# Patient Record
Sex: FEMALE | Race: WHITE | NOT HISPANIC OR LATINO | Employment: FULL TIME | URBAN - METROPOLITAN AREA
[De-identification: names, ages, dates, MRNs, and addresses within clinical notes are randomized per-mention and may not be internally consistent; named-entity substitution may affect disease eponyms.]

---

## 2017-01-03 ENCOUNTER — OFFICE VISIT (OUTPATIENT)
Dept: FAMILY MEDICINE CLINIC | Facility: CLINIC | Age: 57
End: 2017-01-03

## 2017-01-03 VITALS
SYSTOLIC BLOOD PRESSURE: 124 MMHG | TEMPERATURE: 98.7 F | HEIGHT: 63 IN | DIASTOLIC BLOOD PRESSURE: 82 MMHG | OXYGEN SATURATION: 99 % | RESPIRATION RATE: 16 BRPM | BODY MASS INDEX: 9.92 KG/M2 | WEIGHT: 56 LBS | HEART RATE: 83 BPM

## 2017-01-03 DIAGNOSIS — J32.0 CHRONIC MAXILLARY SINUSITIS: Primary | ICD-10-CM

## 2017-01-03 PROBLEM — J30.1 SEASONAL ALLERGIC RHINITIS DUE TO POLLEN: Status: ACTIVE | Noted: 2017-01-03

## 2017-01-03 PROCEDURE — 99213 OFFICE O/P EST LOW 20 MIN: CPT | Performed by: INTERNAL MEDICINE

## 2017-01-03 RX ORDER — ASCORBATE CALCIUM 500 MG
TABLET ORAL
COMMUNITY

## 2017-01-03 RX ORDER — MOMETASONE FUROATE 50 UG/1
2 SPRAY, METERED NASAL DAILY
Qty: 17 G | Refills: 3 | Status: SHIPPED | OUTPATIENT
Start: 2017-01-03

## 2017-01-03 RX ORDER — MULTIVIT WITH MINERALS/LUTEIN
250 TABLET ORAL DAILY
COMMUNITY
End: 2017-11-21 | Stop reason: SINTOL

## 2017-01-03 RX ORDER — AMOXICILLIN 875 MG/1
875 TABLET, COATED ORAL 2 TIMES DAILY
Qty: 20 TABLET | Refills: 0 | Status: SHIPPED | OUTPATIENT
Start: 2017-01-03 | End: 2017-08-30

## 2017-01-03 NOTE — PROGRESS NOTES
Subjective   Arcelia Roberson is a 56 y.o. female.     History of Present Illness   Patient said the congestion for about one week.  She has a history of chronic allergies and sinusitis.  She was placed on Nasonex and Amoxil and asked to follow-up or days not better.  The following portions of the patient's history were reviewed and updated as appropriate: allergies, current medications, past family history, past medical history, past social history, past surgical history and problem list.    Review of Systems   Constitutional: Negative for fatigue and fever.   HENT: Positive for congestion and sinus pressure. Negative for trouble swallowing.    Eyes: Negative for discharge and visual disturbance.   Respiratory: Negative for choking and shortness of breath.    Cardiovascular: Negative for chest pain and palpitations.   Gastrointestinal: Negative for abdominal pain and blood in stool.   Endocrine: Negative.    Genitourinary: Negative for genital sores and hematuria.   Musculoskeletal: Negative for gait problem and joint swelling.   Skin: Negative for color change, pallor, rash and wound.   Allergic/Immunologic: Positive for environmental allergies. Negative for immunocompromised state.   Neurological: Negative for facial asymmetry and speech difficulty.   Psychiatric/Behavioral: Negative for hallucinations and suicidal ideas.       Objective   Physical Exam   Constitutional: She is oriented to person, place, and time. She appears well-developed and well-nourished.   HENT:   Head: Normocephalic.   Maxillary tenderness   Eyes: Conjunctivae are normal. Pupils are equal, round, and reactive to light.   Neck: Normal range of motion. Neck supple.   Cardiovascular: Normal rate, regular rhythm and normal heart sounds.    Pulmonary/Chest: Effort normal and breath sounds normal.   Abdominal: Soft. Bowel sounds are normal.   Musculoskeletal: Normal range of motion.   Neurological: She is alert and oriented to person, place, and  time.   Skin: Skin is warm and dry.   Psychiatric: She has a normal mood and affect. Her behavior is normal. Judgment and thought content normal.   Nursing note and vitals reviewed.      Assessment/Plan   Problems Addressed this Visit     None      Visit Diagnoses     Chronic maxillary sinusitis    -  Primary

## 2017-01-03 NOTE — MR AVS SNAPSHOT
Arcelia Roberson   1/3/2017 10:45 AM   Office Visit    Dept Phone:  [phone]   Encounter #:  [id number]    Provider:  Maikol Dawson MD   Department:  [facility]                Your Full Care Plan              Today's Medication Changes          These changes are accurate as of: 1/3/17 12:09 PM.  If you have any questions, ask your nurse or doctor.               New Medication(s)Ordered:     amoxicillin 875 MG tablet   Commonly known as:  AMOXIL   Take 1 tablet by mouth 2 (Two) Times a Day.   Started by:  Maikol Dawson MD       mometasone 50 MCG/ACT nasal spray   Commonly known as:  NASONEX   2 sprays into each nostril Daily.   Started by:  Maikol Dawson MD            Where to Get Your Medications      These medications were sent to [facility]/pharmacy #[id number] [place], [place] - [address] [phone] [facility] [phone]   [address] [address]     Phone:  [phone]     amoxicillin 875 MG tablet    mometasone 50 MCG/ACT nasal spray                  Your Updated Medication List          This list is accurate as of: 1/3/17 12:09 PM.  Always use your most recent med list.                amoxicillin 875 MG tablet   Commonly known as:  AMOXIL   Take 1 tablet by mouth 2 (Two) Times a Day.       mometasone 50 MCG/ACT nasal spray   Commonly known as:  NASONEX   2 sprays into each nostril Daily.       vitamin C 250 MG tablet   Commonly known as:  ASCORBIC ACID       Vitamin E 100 UNITS tablet               You Were Diagnosed With        Codes Comments    Chronic maxillary sinusitis    -  Primary ICD-10-CM: J32.0  ICD-9-CM: 473.0       Instructions     None    Patient Instructions History      Upcoming Appointments     Visit Type Date Time Department    OFFICE VISIT 1/3/2017 10:45 AM CINDY Sands Signup     [facility] allows you to send messages to your doctor, view your test results,  "renew your prescriptions, schedule appointments, and more. To sign up, go to [email or web address] and click on the Sign Up Now link in the New User? box. Enter your [employer] Activation Code exactly as it appears below along with the last four digits of your Social Security Number and your Date of Birth () to complete the sign-up process. If you do not sign up before the expiration date, you must request a new code.    [employer] Activation Code: [email or web address]  Expires: 2017 12:09 PM    If you have questions, you can email [email or web address] or call [phone] to talk to our [employer] staff. Remember, [employer] is NOT to be used for urgent needs. For medical emergencies, dial 911.               Other Info from Your Visit           Allergies     Azithromycin      Codeine        Reason for Visit     Cough congestion x 1 week      Vital Signs     Blood Pressure Pulse Temperature Respirations Height Weight    124/82 (BP Location: Right arm, Patient Position: Sitting, Cuff Size: Adult) 83 98.7 °F (37.1 °C) (Oral) 16 63\" (160 cm) 56 lb (25.4 kg)    Oxygen Saturation Body Mass Index Smoking Status             99% 9.92 kg/m2 Never Smoker         Problems and Diagnoses Noted     Seasonal allergic rhinitis due to pollen    Chronic maxillary sinusitis    -  Primary        "

## 2017-04-12 ENCOUNTER — APPOINTMENT (OUTPATIENT)
Dept: WOMENS IMAGING | Facility: HOSPITAL | Age: 57
End: 2017-04-12

## 2017-04-12 PROCEDURE — G0202 SCR MAMMO BI INCL CAD: HCPCS | Performed by: RADIOLOGY

## 2017-04-12 PROCEDURE — 77063 BREAST TOMOSYNTHESIS BI: CPT | Performed by: RADIOLOGY

## 2017-04-12 PROCEDURE — 77067 SCR MAMMO BI INCL CAD: CPT | Performed by: RADIOLOGY

## 2017-08-29 PROCEDURE — 93000 ELECTROCARDIOGRAM COMPLETE: CPT | Performed by: FAMILY MEDICINE

## 2017-08-29 NOTE — PROGRESS NOTES
SUBJECTIVE:   Arcelia Roberson is a 57 y.o., female, who presents for a complete physical examination.    Occupation:  Reviewed  Family History:  Reviewed  Past Medical History: Reviewed  Surgical History:  Reviewed  Social History:  Reviewed    Review of Systems:  Constitutional:  Negative for chills, fever, dizziness, weight loss, diaphoresis,   Eyes:  Negative for blurred vision, redness, discharge, diplopia, photophobia or itching.  ENT:  Negative for earache, ST, toothache, rhinorrhea, hoarseness, or PND.  Musculoskeletal:  Negative for neck pain.  No heat, myalgia, redness or joint pain.  She occasionally gets what she feels is sciatica on the right side which is usually controlled with an Advil.  Cardiovascular:  Negative for orthopnea, chest pain, LE edema, palpitations, or rapid heart rate.    Skin:  Negative for bruising, swelling, rash, abrasions, or itching.    Respiratory:  Negatie for pleuritic chest pain, shortness of air, nonproductive/productive cough, hemoptysis, or HUGHES.    Neurologic:  Negative for history of weakness, numbness, paresthesia, loss of consciousness, speech change, or ataxia.    Gastrointestinal:  Negative for melena, hematochezia, nausea, vomiting, change in bowel habits.    Genitourinary:  Negative for flank pain, dysuria, frequency, or hematuria.    Psychiatric:  Negative for agitation, suicidal ideation, change in mental status, depression, confusion, or insomnia.  Hematologic:  Negative for nodes, bruising, bleeding, or petechiae.  Immunologic:  Negative for atopic dermatitis, sneezing, rhinorrhea, or hives.    ALLERGIES:  Z-Esequiel and codeine    OBJECTIVE:    Vital Signs:  Reviewed and stable.  Blood pressure 148/78  Constitutional:  Alert and oriented x3, in no acute distress.  Eyes:  Sclerae white, conjunctivae clear.  Lids are without lag.  PERRLA.    Ears:  No scars, lesions or masses.  Tympanic membranes translucent, nonbulging, and mobile. Canal walls are pink.  Septum is  midline.  Raised resolving lesion is present at the upper lip border needing the nasal septum.  It has appearance of a cold sore.   Mouth:  Lips are pink and symmetrical.  Gums are pink.  Good dentition.    Throat:  Oral mucosa pink and moist.  Salivery glands intact.  Soft and hard palates contiguous.  Tongue moist without ulcers.  Gag reflex present.    Neck:  Full range of motion.  Trachea midline position.  No thyromegaly.   Respiratory:  Respirations are even and unlabored.  Lung fields with no flatness, dullness, or hyperresonance.  Clear and equal breath sounds with no adventitious sounds bilaterally.    Cardiovascular:  No lifts, heaves, or thrills.  PMI present.  S1 and S2 not exaggerated or diminished.  Regular rate and rhythm, without murmurs, rubs or gallops.  Normal carotids.  Pedal pulses are within normal limits bilaterally.  No edema.  No varicositis.    Chest:  Equal bilateral expansion  Abdomen:  No masses or tenderness.  Bowel sounds active x4 quadrants.  Live and spleen are without tenderness or enlargement.  No hernias.    Digital Rectal Exam:  Performed in May by her gynecologist and was normal.  Colonoscopy is up-to-date  Genitalia:  Patient states GYN exam and breast exam up-to-date per her gynecologist  Lymphatic:  Areas palpated are not enlarged.    Musculoskeletal:  Gait coordinated and smooth.  Digits are without clubbing or cyanosis.   Skin:  No rashes, lesions, or ulcers.  No discoloration.  Warm and dry.  Normal turgor.    Neurologic:  Cranial nerves are intact.  Deep tendon reflexes are 2+ bilaterally.  Superficial touch and pain sensation intact bilaterally.  Psychiatric:  Judgement and insight are normal.  Orientation to time, place and person.  Normal mood and affect.  Memory intact.     EKG is done here and interpreted by me.  Indication complete physical.  EKG compared to May 2014 is normal and unchanged.     Chest x-ray is done here and interpreted by me.  No old x-rays  available for comparison.  Indication is complete physical.  X-ray shows no abnormalities and appears normal.      ECG 12 Lead  Date/Time: 8/29/2017 8:33 AM  Performed by: NESTOR GRANT  Authorized by: NESTOR GRANT   Clinical impression: normal ECG            LABORATORY:  CMP fasting lipid TSH thyroid profile CBC urinalysis vitamin D Hemoccult are ordered.    ASSESSMENT:  Complete physical examination.      PL.AN:  Patient will follow up on labs.  Diet and exercise discussed.  Valtrex 2 g twice a day.

## 2017-08-30 ENCOUNTER — OFFICE VISIT (OUTPATIENT)
Dept: FAMILY MEDICINE CLINIC | Facility: CLINIC | Age: 57
End: 2017-08-30

## 2017-08-30 VITALS
BODY MASS INDEX: 26.93 KG/M2 | HEART RATE: 66 BPM | WEIGHT: 152 LBS | HEIGHT: 63 IN | RESPIRATION RATE: 18 BRPM | SYSTOLIC BLOOD PRESSURE: 148 MMHG | TEMPERATURE: 98.4 F | DIASTOLIC BLOOD PRESSURE: 78 MMHG | OXYGEN SATURATION: 100 %

## 2017-08-30 DIAGNOSIS — Z00.00 ENCOUNTER FOR PREVENTATIVE ADULT HEALTH CARE EXAMINATION: Primary | ICD-10-CM

## 2017-08-30 LAB
25(OH)D3 SERPL-MCNC: 36.8 NG/ML (ref 30–100)
ALBUMIN SERPL-MCNC: 4.4 G/DL (ref 3.5–5.2)
ALBUMIN/GLOB SERPL: 1.7 G/DL
ALP SERPL-CCNC: 61 U/L (ref 39–117)
ALT SERPL W P-5'-P-CCNC: 23 U/L (ref 1–33)
ANION GAP SERPL CALCULATED.3IONS-SCNC: 9.9 MMOL/L
AST SERPL-CCNC: 24 U/L (ref 1–32)
BACTERIA UR QL AUTO: NORMAL /HPF
BILIRUB SERPL-MCNC: 0.4 MG/DL (ref 0.1–1.2)
BILIRUB UR QL STRIP: NEGATIVE
BUN BLD-MCNC: 14 MG/DL (ref 6–20)
BUN/CREAT SERPL: 17.7 (ref 7–25)
CALCIUM SPEC-SCNC: 9.5 MG/DL (ref 8.6–10.5)
CHLORIDE SERPL-SCNC: 104 MMOL/L (ref 98–107)
CHOLEST SERPL-MCNC: 220 MG/DL (ref 0–200)
CLARITY UR: CLEAR
CO2 SERPL-SCNC: 28.1 MMOL/L (ref 22–29)
COLOR UR: YELLOW
CREAT BLD-MCNC: 0.79 MG/DL (ref 0.57–1)
ERYTHROCYTE [DISTWIDTH] IN BLOOD BY AUTOMATED COUNT: 12.7 % (ref 4.5–15)
GFR SERPL CREATININE-BSD FRML MDRD: 75 ML/MIN/1.73
GLOBULIN UR ELPH-MCNC: 2.6 GM/DL
GLUCOSE BLD-MCNC: 92 MG/DL (ref 65–99)
GLUCOSE UR STRIP-MCNC: NEGATIVE MG/DL
HCT VFR BLD AUTO: 35.5 % (ref 31–42)
HDLC SERPL-MCNC: 64 MG/DL (ref 40–60)
HGB BLD-MCNC: 11.7 G/DL (ref 12–18)
HGB UR QL STRIP.AUTO: NEGATIVE
KETONES UR QL STRIP: NEGATIVE
LDLC SERPL CALC-MCNC: 143 MG/DL (ref 0–100)
LDLC/HDLC SERPL: 2.23 {RATIO}
LEUKOCYTE ESTERASE UR QL STRIP.AUTO: NEGATIVE
LYMPHOCYTES # BLD AUTO: 1.8 10*3/MM3 (ref 1.2–3.4)
LYMPHOCYTES NFR BLD AUTO: 28 % (ref 21–51)
MCH RBC QN AUTO: 30.6 PG (ref 26.1–33.1)
MCHC RBC AUTO-ENTMCNC: 33 G/DL (ref 33–37)
MCV RBC AUTO: 92.8 FL (ref 80–99)
MONOCYTES # BLD AUTO: 0.3 10*3/MM3 (ref 0.1–0.6)
MONOCYTES NFR BLD AUTO: 5.2 % (ref 2–9)
NEUTROPHILS # BLD AUTO: 4.2 10*3/MM3 (ref 1.4–6.5)
NEUTROPHILS NFR BLD AUTO: 66.8 % (ref 42–75)
NITRITE UR QL STRIP: NEGATIVE
PH UR STRIP.AUTO: 6.5 [PH] (ref 4.6–8)
PLATELET # BLD AUTO: 233 10*3/MM3 (ref 150–450)
PMV BLD AUTO: 8 FL (ref 7.1–10.5)
POTASSIUM BLD-SCNC: 4.2 MMOL/L (ref 3.5–5.2)
PROT SERPL-MCNC: 7 G/DL (ref 6–8.5)
PROT UR QL STRIP: NEGATIVE
RBC # BLD AUTO: 3.83 10*6/MM3 (ref 4–6)
RBC # UR: NORMAL /HPF
REF LAB TEST METHOD: NORMAL
SODIUM BLD-SCNC: 142 MMOL/L (ref 136–145)
SP GR UR STRIP: <=1.005 (ref 1–1.03)
SQUAMOUS #/AREA URNS HPF: NORMAL /HPF
T-UPTAKE NFR SERPL: 1.08 TBI (ref 0.8–1.3)
T4 SERPL-MCNC: 6.45 MCG/DL (ref 4.5–11.7)
TRIGL SERPL-MCNC: 65 MG/DL (ref 0–150)
TSH SERPL DL<=0.05 MIU/L-ACNC: 1.62 MIU/ML (ref 0.27–4.2)
UROBILINOGEN UR QL STRIP: NORMAL
VLDLC SERPL-MCNC: 13 MG/DL (ref 5–40)
WBC NRBC COR # BLD: 6.3 10*3/MM3 (ref 4.5–10)
WBC UR QL AUTO: NORMAL /HPF

## 2017-08-30 PROCEDURE — 71020 XR CHEST PA AND LATERAL: CPT | Performed by: FAMILY MEDICINE

## 2017-08-30 PROCEDURE — 99396 PREV VISIT EST AGE 40-64: CPT | Performed by: FAMILY MEDICINE

## 2017-08-30 PROCEDURE — 82306 VITAMIN D 25 HYDROXY: CPT | Performed by: FAMILY MEDICINE

## 2017-08-30 PROCEDURE — 84479 ASSAY OF THYROID (T3 OR T4): CPT | Performed by: FAMILY MEDICINE

## 2017-08-30 PROCEDURE — 36415 COLL VENOUS BLD VENIPUNCTURE: CPT | Performed by: FAMILY MEDICINE

## 2017-08-30 PROCEDURE — 80053 COMPREHEN METABOLIC PANEL: CPT | Performed by: FAMILY MEDICINE

## 2017-08-30 PROCEDURE — 80061 LIPID PANEL: CPT | Performed by: FAMILY MEDICINE

## 2017-08-30 PROCEDURE — 84443 ASSAY THYROID STIM HORMONE: CPT | Performed by: FAMILY MEDICINE

## 2017-08-30 PROCEDURE — 81001 URINALYSIS AUTO W/SCOPE: CPT | Performed by: FAMILY MEDICINE

## 2017-08-30 PROCEDURE — 85025 COMPLETE CBC W/AUTO DIFF WBC: CPT | Performed by: FAMILY MEDICINE

## 2017-08-30 PROCEDURE — 84436 ASSAY OF TOTAL THYROXINE: CPT | Performed by: FAMILY MEDICINE

## 2017-08-30 RX ORDER — NIACIN 500 MG
500 TABLET ORAL NIGHTLY
COMMUNITY

## 2017-08-30 RX ORDER — ESTRADIOL 0.07 MG/D
1 FILM, EXTENDED RELEASE TRANSDERMAL 2 TIMES WEEKLY
COMMUNITY

## 2017-08-30 RX ORDER — VALACYCLOVIR HYDROCHLORIDE 1 G/1
TABLET, FILM COATED ORAL
Qty: 21 TABLET | Refills: 0 | Status: SHIPPED | OUTPATIENT
Start: 2017-08-30 | End: 2017-12-15 | Stop reason: SDUPTHER

## 2017-08-30 RX ORDER — VALACYCLOVIR HYDROCHLORIDE 1 G/1
TABLET, FILM COATED ORAL
Qty: 21 TABLET | Refills: 0 | Status: SHIPPED | OUTPATIENT
Start: 2017-08-30 | End: 2017-08-30 | Stop reason: SDUPTHER

## 2017-11-21 ENCOUNTER — OFFICE VISIT (OUTPATIENT)
Dept: FAMILY MEDICINE CLINIC | Facility: CLINIC | Age: 57
End: 2017-11-21

## 2017-11-21 VITALS
RESPIRATION RATE: 18 BRPM | OXYGEN SATURATION: 100 % | TEMPERATURE: 97.8 F | WEIGHT: 155 LBS | SYSTOLIC BLOOD PRESSURE: 140 MMHG | HEIGHT: 63 IN | DIASTOLIC BLOOD PRESSURE: 70 MMHG | BODY MASS INDEX: 27.46 KG/M2 | HEART RATE: 66 BPM

## 2017-11-21 DIAGNOSIS — M54.42 LEFT-SIDED LOW BACK PAIN WITH LEFT-SIDED SCIATICA, UNSPECIFIED CHRONICITY: Primary | ICD-10-CM

## 2017-11-21 PROCEDURE — 99213 OFFICE O/P EST LOW 20 MIN: CPT | Performed by: FAMILY MEDICINE

## 2017-11-21 PROCEDURE — 72100 X-RAY EXAM L-S SPINE 2/3 VWS: CPT | Performed by: FAMILY MEDICINE

## 2017-11-21 RX ORDER — CYCLOBENZAPRINE HCL 5 MG
5 TABLET ORAL 3 TIMES DAILY PRN
Qty: 30 TABLET | Refills: 1 | Status: SHIPPED | OUTPATIENT
Start: 2017-11-21

## 2017-11-21 RX ORDER — MELOXICAM 15 MG/1
15 TABLET ORAL DAILY
Qty: 30 TABLET | Refills: 1 | Status: SHIPPED | OUTPATIENT
Start: 2017-11-21

## 2017-11-21 NOTE — PROGRESS NOTES
SUBJECTIVE:  The patient is [] 57-year-old white female who comes in with a 2-3 month history of back pain radiating down her buttocks to just above her knee.  Occasionally goes to her lower leg.  No numbness or tingling.  No injury.  Pain is worse with ambulation.    PAST MEDICAL HISTORY:  Reviewed.    REVIEW OF SYSTEMS:  Please see above; 14 point ROS otherwise negative.      OBJECTIVE: Vitals signs are reviewed and are stable.      Back: Tenderness is present to palpation over the sciatic notch.  Range of motion is full.  Straight leg raises are negative.  Neurological l: Grossly intact without motor or sensory deficits.  Deep tendon reflexes symmetrical.      Two-view x-ray of the lumbar spine is done here interpreted by me.  Indication back pain with radicular pain.  No old x-rays for comparison.  X-ray shows no acute abnormalities.        ASSESSMENT:    []Back pain with radicular pain    PLAN:  Mobic[]15 mg daily with food.  Flexeril 5 mg 3 times a day when necessary.  Physical therapy if no better.  Ice/heat.  Follow up in about a week if no better.  Notify me sooner if worse or problems.    Much of this encounter note is an electronic transcription/translation of spoken language to printed text.  The electronic translation of spoken language may permit erroneous, or at times, nonsensical words or phrases to be inadvertently transcribed.  Although I have reviewed the note for such errors, some may still exist.

## 2017-12-15 ENCOUNTER — TELEPHONE (OUTPATIENT)
Dept: FAMILY MEDICINE CLINIC | Facility: CLINIC | Age: 57
End: 2017-12-15

## 2017-12-15 RX ORDER — VALACYCLOVIR HYDROCHLORIDE 1 G/1
TABLET, FILM COATED ORAL
Qty: 21 TABLET | Refills: 0 | Status: SHIPPED | OUTPATIENT
Start: 2017-12-15

## 2017-12-20 NOTE — TELEPHONE ENCOUNTER
Pt informed she says medication was filled already by [facility] on the 12/15 takes medicine for cold sores

## 2018-08-21 ENCOUNTER — OFFICE VISIT (OUTPATIENT)
Dept: FAMILY MEDICINE CLINIC | Facility: CLINIC | Age: 58
End: 2018-08-21

## 2018-08-21 VITALS
OXYGEN SATURATION: 100 % | TEMPERATURE: 98 F | RESPIRATION RATE: 18 BRPM | HEART RATE: 66 BPM | SYSTOLIC BLOOD PRESSURE: 170 MMHG | DIASTOLIC BLOOD PRESSURE: 80 MMHG | BODY MASS INDEX: 27.29 KG/M2 | HEIGHT: 63 IN | WEIGHT: 154 LBS

## 2018-08-21 DIAGNOSIS — R07.9 CHEST PAIN, UNSPECIFIED TYPE: ICD-10-CM

## 2018-08-21 DIAGNOSIS — I10 ESSENTIAL HYPERTENSION: Primary | ICD-10-CM

## 2018-08-21 LAB
BACTERIA UR QL AUTO: ABNORMAL /HPF
BILIRUB UR QL STRIP: NEGATIVE
CLARITY UR: CLEAR
COLOR UR: YELLOW
GLUCOSE UR STRIP-MCNC: NEGATIVE MG/DL
HGB UR QL STRIP.AUTO: ABNORMAL
KETONES UR QL STRIP: NEGATIVE
LEUKOCYTE ESTERASE UR QL STRIP.AUTO: NEGATIVE
NITRITE UR QL STRIP: NEGATIVE
PH UR STRIP.AUTO: 6.5 [PH] (ref 4.6–8)
PROT UR QL STRIP: NEGATIVE
RBC # UR: ABNORMAL /HPF
REF LAB TEST METHOD: ABNORMAL
SP GR UR STRIP: 1.01 (ref 1–1.03)
SQUAMOUS #/AREA URNS HPF: ABNORMAL /HPF
T-UPTAKE NFR SERPL: 1.04 TBI (ref 0.8–1.3)
T4 SERPL-MCNC: 8.14 MCG/DL (ref 4.5–11.7)
TSH SERPL DL<=0.05 MIU/L-ACNC: 2.16 MIU/ML (ref 0.27–4.2)
UROBILINOGEN UR QL STRIP: ABNORMAL
WBC UR QL AUTO: ABNORMAL /HPF

## 2018-08-21 PROCEDURE — 84436 ASSAY OF TOTAL THYROXINE: CPT | Performed by: FAMILY MEDICINE

## 2018-08-21 PROCEDURE — 84443 ASSAY THYROID STIM HORMONE: CPT | Performed by: FAMILY MEDICINE

## 2018-08-21 PROCEDURE — 81001 URINALYSIS AUTO W/SCOPE: CPT | Performed by: FAMILY MEDICINE

## 2018-08-21 PROCEDURE — 99213 OFFICE O/P EST LOW 20 MIN: CPT | Performed by: FAMILY MEDICINE

## 2018-08-21 PROCEDURE — 84479 ASSAY OF THYROID (T3 OR T4): CPT | Performed by: FAMILY MEDICINE

## 2018-08-21 PROCEDURE — 36415 COLL VENOUS BLD VENIPUNCTURE: CPT | Performed by: FAMILY MEDICINE

## 2018-08-21 RX ORDER — IRBESARTAN 75 MG/1
75 TABLET ORAL NIGHTLY
Qty: 90 TABLET | Refills: 3 | Status: SHIPPED | OUTPATIENT
Start: 2018-08-21

## 2018-08-21 RX ORDER — FAMOTIDINE 20 MG
TABLET ORAL
COMMUNITY

## 2018-08-21 NOTE — PROGRESS NOTES
SUBJECTIVE:  The patient is a 58-year-old white female comes in for follow-up.  She is seen in emergency department over the past weekend with uncontrolled blood pressure.  She's had some chest discomfort.  Please refer to emergency room visit for specific details.  She has no history of hypertension though it runs in her family.  The blood pressure today in this office is 170/80.    PAST MEDICAL HISTORY:  Reviewed.    REVIEW OF SYSTEMS:  Please see above; 14 point ROS negative.      OBJECTIVE: Vitals signs are reviewed and are stable.    HEENT: PERRLA.   Neck:  Supple.   Lungs:  Clear.    Heart:  Regular rate and rhythm.   Abdomen:   Soft, nontender.   Extremities:  No cyanosis, clubbing or edema.     ASSESSMENT:    New-onset hypertension    PLAN: Labs are reviewed from emergency room visit.  The urinalysis and TSH with thyroid profile is ordered today.  She will start Avapro 75 mg.  She will closely monitor blood pressure.  She'll follow-up in a week or 2.  She will let me know if any problems.  She will be referred for a stress echo.  She'll go to emergency room if problems.    Dictated utilizing Dragon dictation.

## 2018-08-29 ENCOUNTER — OFFICE VISIT (OUTPATIENT)
Dept: FAMILY MEDICINE CLINIC | Facility: CLINIC | Age: 58
End: 2018-08-29

## 2018-08-29 VITALS
OXYGEN SATURATION: 98 % | HEART RATE: 74 BPM | BODY MASS INDEX: 26.58 KG/M2 | HEIGHT: 63 IN | WEIGHT: 150 LBS | DIASTOLIC BLOOD PRESSURE: 76 MMHG | TEMPERATURE: 98.5 F | SYSTOLIC BLOOD PRESSURE: 136 MMHG

## 2018-08-29 DIAGNOSIS — I10 ESSENTIAL HYPERTENSION: Primary | ICD-10-CM

## 2018-08-29 DIAGNOSIS — J01.90 ACUTE NON-RECURRENT SINUSITIS, UNSPECIFIED LOCATION: ICD-10-CM

## 2018-08-29 LAB
ANION GAP SERPL CALCULATED.3IONS-SCNC: 11.3 MMOL/L
BUN BLD-MCNC: 16 MG/DL (ref 6–20)
BUN/CREAT SERPL: 22.9 (ref 7–25)
CALCIUM SPEC-SCNC: 9.6 MG/DL (ref 8.6–10.5)
CHLORIDE SERPL-SCNC: 103 MMOL/L (ref 98–107)
CO2 SERPL-SCNC: 25.7 MMOL/L (ref 22–29)
CREAT BLD-MCNC: 0.7 MG/DL (ref 0.57–1)
GFR SERPL CREATININE-BSD FRML MDRD: 86 ML/MIN/1.73
GLUCOSE BLD-MCNC: 82 MG/DL (ref 65–99)
POTASSIUM BLD-SCNC: 3.9 MMOL/L (ref 3.5–5.2)
SODIUM BLD-SCNC: 140 MMOL/L (ref 136–145)

## 2018-08-29 PROCEDURE — 80048 BASIC METABOLIC PNL TOTAL CA: CPT | Performed by: FAMILY MEDICINE

## 2018-08-29 PROCEDURE — 99213 OFFICE O/P EST LOW 20 MIN: CPT | Performed by: FAMILY MEDICINE

## 2018-08-29 PROCEDURE — 36415 COLL VENOUS BLD VENIPUNCTURE: CPT | Performed by: FAMILY MEDICINE

## 2018-08-29 RX ORDER — ESTRADIOL 0.04 MG/D
FILM, EXTENDED RELEASE TRANSDERMAL
COMMUNITY
Start: 2018-07-10

## 2018-08-29 RX ORDER — AMOXICILLIN 875 MG/1
875 TABLET, COATED ORAL 2 TIMES DAILY
Qty: 20 TABLET | Refills: 0 | Status: SHIPPED | OUTPATIENT
Start: 2018-08-29

## 2018-08-29 NOTE — PROGRESS NOTES
SUBJECTIVE:  The patient is a 58-year-old white female is here for follow-up.  She was treated for hypertension for the first time on her visit last week.  She began Avapro 75 mg daily.  Her blood pressure is 136/76 today.  She's been on medication 5 days.  When her blood pressure was high she was having sinus pressure upper teeth hurt.  No her blood pressure is down now she still has symptoms of discomfort over her frontal maxillary sinuses and upper teeth.  She has been using Flonase some.    PAST MEDICAL HISTORY:  Reviewed.    REVIEW OF SYSTEMS:  Please see above; 14 point ROS negative.      OBJECTIVE: Vitals signs are reviewed and are stable.    HEENT: PERRLA.  TMs look okay.  Throat clear.  Turbinates look okay.  There is no tenderness to palpation over the gingiva or teeth.  Neck:  Supple.   Lungs:  Clear.    Heart:  Regular rate and rhythm.   Abdomen:   Soft, nontender.   Extremities:  No cyanosis, clubbing or edema.     ASSESSMENT:    Hypertension-better controlled   facial pain and pressure uncertain etiology    PLAN:BMP ordered.  We discussed possible sinus infections due to her persistent symptomatology.  We decided to go ahead and treat her for sinus infection with Flonase and amoxicillin 875 twice a day.  She will let me know if this doesn't improve her frontal facial headaches.  She'll continue to monitor blood pressure.  She will call on her labs.  She will notify me if any problems.    Dictated utilizing Dragon dictation.

## 2021-03-16 ENCOUNTER — BULK ORDERING (OUTPATIENT)
Dept: CASE MANAGEMENT | Facility: OTHER | Age: 61
End: 2021-03-16

## 2021-03-16 DIAGNOSIS — Z23 IMMUNIZATION DUE: ICD-10-CM
